# Patient Record
Sex: FEMALE | Race: WHITE | NOT HISPANIC OR LATINO | Employment: OTHER | URBAN - METROPOLITAN AREA
[De-identification: names, ages, dates, MRNs, and addresses within clinical notes are randomized per-mention and may not be internally consistent; named-entity substitution may affect disease eponyms.]

---

## 2024-09-27 ENCOUNTER — HOSPITAL ENCOUNTER (EMERGENCY)
Facility: CLINIC | Age: 79
Discharge: HOME | End: 2024-09-27
Payer: COMMERCIAL

## 2024-09-27 VITALS
OXYGEN SATURATION: 100 % | DIASTOLIC BLOOD PRESSURE: 76 MMHG | RESPIRATION RATE: 24 BRPM | SYSTOLIC BLOOD PRESSURE: 166 MMHG | TEMPERATURE: 100.3 F | HEART RATE: 104 BPM

## 2024-09-27 DIAGNOSIS — U07.1 COVID-19 VIRUS INFECTION: ICD-10-CM

## 2024-09-27 PROCEDURE — 99203 OFFICE O/P NEW LOW 30 MIN: CPT

## 2024-09-27 PROCEDURE — G0463 HOSPITAL OUTPT CLINIC VISIT: HCPCS

## 2024-09-27 RX ORDER — SPIRONOLACTONE 50 MG/1
50 TABLET, FILM COATED ORAL 2 TIMES DAILY
COMMUNITY

## 2024-09-27 ASSESSMENT — COLUMBIA-SUICIDE SEVERITY RATING SCALE - C-SSRS
1. IN THE PAST MONTH, HAVE YOU WISHED YOU WERE DEAD OR WISHED YOU COULD GO TO SLEEP AND NOT WAKE UP?: NO
2. HAVE YOU ACTUALLY HAD ANY THOUGHTS OF KILLING YOURSELF IN THE PAST MONTH?: NO
6. HAVE YOU EVER DONE ANYTHING, STARTED TO DO ANYTHING, OR PREPARED TO DO ANYTHING TO END YOUR LIFE?: NO

## 2024-09-27 ASSESSMENT — ACTIVITIES OF DAILY LIVING (ADL): ADLS_ACUITY_SCORE: 35

## 2024-09-28 NOTE — ED PROVIDER NOTES
Chief Complaint:   Chief Complaint   Patient presents with    Covid Concern     Patient tested positive for covid today   Patient is requesting paxlovid            HPI:   Sherin Styles is a 79 year old female who presents to the  with a 1 day history of sore throat, congestion, headache, sinus pressure, non productive cough, achiness, and low grade fevers.  Took at home COVID test which was positive today.  She is requesting treatment with Paxlovid.  She has tried acetaminophen, ibuprofen with improvement of symptoms.  She denies chest congestion, chest pain, decreased appetite, dizziness, fatigue, nausea, shortness of breath, vomiting, and wheezing.  She is not taking any medications in addition to what is listed in her chart.  Last GFR on 5/7/2024 was 73.      Meds:   Current Outpatient Medications   Medication Sig Dispense Refill    nirmatrelvir and ritonavir (PAXLOVID) 300 mg/100 mg therapy pack Take 3 tablets by mouth 2 times daily for 5 days. 30 tablet 0    spironolactone (ALDACTONE) 50 MG tablet Take 50 mg by mouth 2 times daily.      aspirin 81 MG EC tablet Take 81 mg by mouth 2 times daily.      DAPTOmycin Inject 500 mg into the vein every 24 hours.      estrogens, conjugated, (PREMARIN) 0.3 MG tablet Take 0.3 mg by mouth daily.      Gabapentin (NEURONTIN PO)       Levothyroxine Sodium 25 MCG CAPS Take  by mouth.      losartan-hydrochlorothiazide (HYZAAR) 50-12.5 MG per tablet Take 1 tablet by mouth daily      Multiple Vitamin (MULTIVITAMINS PO) Take  by mouth.      Probiotic Product (PROBIOTIC PO) Take  by mouth.         Allergies:   Allergies   Allergen Reactions    Rocephin [Ceftriaxone Sodium] Hives, Swelling and Rash    Pravastatin Other (See Comments)    Simvastatin     Amoxicillin Rash    Bactrim [Sulfamethoxazole-Trimethoprim] Rash    Diclofenac Sodium Rash    Propoxyphene Rash    Talwin [Pentazocine Lactate] Rash       Medications updated and reviewed.  Past, family and surgical history is  updated and reviewed in the record.     Review of Systems:  General: see HPI  HEENT: see HPI  Respiratory: see HPI    Physical Exam:   BP (!) 166/76   Pulse 104   Temp 100.3  F (37.9  C) (Tympanic)   Resp 24   SpO2 100%    General: alert and no acute distress  Eyes: negative, conjunctivae not injected /corneas clear. PERRL, EOM's intact.  Ears: negative, External ears normal. Canals clear. TM's normal.  Nose: Appears congested.  Mouth/Throat: NORMAL - no erythema, no adenopathy, no exudates.  Neck: Neck supple. No adenopathy.   Chest/Pulmonary: CTAB without wheezes, rales, or rhonchi. No signs of respiratory distress.  Cardiovascular: RRR normal S1S2  Abdomen: Abdomen soft, non-tender. BS normal.   Skin:  Skin color, texture, turgor normal. No rashes or lesions.      Assessment:  COVID-19 virus infection    Plan:   Patient tested positive for COVID-19 today with symptom onset of yesterday.  She is requesting Paxlovid treatment.  We discussed the risk versus benefits of this and patient decided that she would like to proceed with treatment.  She is not taking any medications in addition to what is listed in her chart, reviewed potential drug drug interactions with her.  Last GFR on 5/7/2024 was 73.  We discussed usage and potential adverse effects of medication.  Also recommended she continue oral analgesics to help with fever.  Recommend follow up in primary care as needed, or sooner if symptoms persist. Return to the ED with fever, weakness, trouble swallowing or breathing, or any other concerns.     Condition on disposition: Stable         Delphine Hilton PA-C  09/28/24 1401